# Patient Record
Sex: FEMALE | Race: WHITE | NOT HISPANIC OR LATINO | Employment: FULL TIME | URBAN - METROPOLITAN AREA
[De-identification: names, ages, dates, MRNs, and addresses within clinical notes are randomized per-mention and may not be internally consistent; named-entity substitution may affect disease eponyms.]

---

## 2017-07-12 LAB — MRSA SCREEN BY PCR: NEGATIVE

## 2017-07-13 ENCOUNTER — HISTORICAL (OUTPATIENT)
Dept: ADMINISTRATIVE | Facility: HOSPITAL | Age: 52
End: 2017-07-13

## 2017-07-13 LAB
ABS NEUT (OLG): 5.58 X10(3)/MCL (ref 2.1–9.2)
ALBUMIN SERPL-MCNC: 3.5 GM/DL (ref 3.4–5)
ALBUMIN/GLOB SERPL: 1.2 {RATIO}
ALP SERPL-CCNC: 87 UNIT/L (ref 38–126)
ALT SERPL-CCNC: 26 UNIT/L (ref 12–78)
APTT PPP: 31.4 SECOND(S) (ref 20.6–36)
AST SERPL-CCNC: 18 UNIT/L (ref 15–37)
BASOPHILS # BLD AUTO: 0 X10(3)/MCL (ref 0–0.2)
BASOPHILS NFR BLD AUTO: 0 %
BILIRUB SERPL-MCNC: 0.5 MG/DL (ref 0.2–1)
BILIRUBIN DIRECT+TOT PNL SERPL-MCNC: 0.1 MG/DL (ref 0–0.2)
BILIRUBIN DIRECT+TOT PNL SERPL-MCNC: 0.4 MG/DL (ref 0–0.8)
BUN SERPL-MCNC: 10 MG/DL (ref 7–18)
CALCIUM SERPL-MCNC: 8.6 MG/DL (ref 8.5–10.1)
CHLORIDE SERPL-SCNC: 105 MMOL/L (ref 98–107)
CO2 SERPL-SCNC: 24 MMOL/L (ref 21–32)
CREAT SERPL-MCNC: 0.68 MG/DL (ref 0.55–1.02)
CROSSMATCH INTERPRETATION: NORMAL
EOSINOPHIL # BLD AUTO: 0.1 X10(3)/MCL (ref 0–0.9)
EOSINOPHIL NFR BLD AUTO: 1 %
ERYTHROCYTE [DISTWIDTH] IN BLOOD BY AUTOMATED COUNT: 12.9 % (ref 11.5–17)
GLOBULIN SER-MCNC: 3 GM/DL (ref 2.4–3.5)
GLUCOSE SERPL-MCNC: 92 MG/DL (ref 74–106)
GROUP & RH: NORMAL
HCO3 UR-SCNC: 23.9 MMOL/L (ref 22–26)
HCT VFR BLD AUTO: 35.2 % (ref 37–47)
HGB BLD-MCNC: 12.1 GM/DL (ref 12–16)
INR PPP: 1.14 (ref 0–1.27)
LYMPHOCYTES # BLD AUTO: 1.2 X10(3)/MCL (ref 0.6–4.6)
LYMPHOCYTES NFR BLD AUTO: 15 %
MCH RBC QN AUTO: 31.1 PG (ref 27–31)
MCHC RBC AUTO-ENTMCNC: 34.4 GM/DL (ref 33–36)
MCV RBC AUTO: 90.5 FL (ref 80–94)
MONOCYTES # BLD AUTO: 0.8 X10(3)/MCL (ref 0.1–1.3)
MONOCYTES NFR BLD AUTO: 10 %
NEUTROPHILS # BLD AUTO: 5.58 X10(3)/MCL (ref 2.1–9.2)
NEUTROPHILS NFR BLD AUTO: 72 %
O2 HGB ARTERIAL: 96.1 % (ref 94–97)
PCO2 BLDA: 36 MMHG (ref 35–45)
PH SMN: 7.43 [PH] (ref 7.35–7.45)
PLATELET # BLD AUTO: 245 X10(3)/MCL (ref 130–400)
PMV BLD AUTO: 10.7 FL (ref 9.4–12.4)
PO2 BLDA: 81 MMHG (ref 80–100)
POC ALLENS TEST: ABNORMAL
POC BE: -0.1 (ref -2–2)
POC CAO2: 16.3 ML/DL (ref 15.7–21.6)
POC CO HGB: 2.1 %
POC CO2: 25 MMOL/L (ref 22–27)
POC IONIZED CALCIUM: 1.18 MMOL/L (ref 1.12–1.23)
POC MET HGB: 0.8 % (ref 0.4–1.5)
POC SAMPLESOURCE: ABNORMAL
POC SATURATED O2: 96.2 % (ref 95–98)
POC SITE: ABNORMAL
POC THB: 12 GM/DL (ref 12–16)
POC TREATMENT: ABNORMAL
POTASSIUM BLD-SCNC: 3.8 MMOL/L (ref 3.6–5)
POTASSIUM SERPL-SCNC: 4 MMOL/L (ref 3.5–5.1)
PRODUCT READY: NORMAL
PROT SERPL-MCNC: 6.5 GM/DL (ref 6.4–8.2)
PROTHROMBIN TIME: 14.4 SECOND(S) (ref 12.1–14.2)
RBC # BLD AUTO: 3.89 X10(6)/MCL (ref 4.2–5.4)
SODIUM BLD-SCNC: 136 MMOL/L (ref 137–145)
SODIUM SERPL-SCNC: 138 MMOL/L (ref 136–145)
WBC # SPEC AUTO: 7.7 X10(3)/MCL (ref 4.5–11.5)

## 2017-07-14 LAB
ABS NEUT (OLG): 11.26 X10(3)/MCL (ref 2.1–9.2)
ABS NEUT (OLG): 5.14 X10(3)/MCL (ref 2.1–9.2)
ALBUMIN SERPL-MCNC: 3.3 GM/DL (ref 3.4–5)
ALBUMIN/GLOB SERPL: 1.1 RATIO (ref 1.1–2)
ALP SERPL-CCNC: 80 UNIT/L (ref 38–126)
ALT SERPL-CCNC: 35 UNIT/L (ref 12–78)
APPEARANCE, UA: ABNORMAL
APTT PPP: 28.2 SECOND(S) (ref 20.6–36)
APTT PPP: 32 SECOND(S) (ref 20.6–36)
AST SERPL-CCNC: 28 UNIT/L (ref 15–37)
BACTERIA SPEC CULT: ABNORMAL /HPF
BASOPHILS # BLD AUTO: 0 X10(3)/MCL (ref 0–0.2)
BASOPHILS # BLD AUTO: 0 X10(3)/MCL (ref 0–0.2)
BASOPHILS NFR BLD AUTO: 0 %
BASOPHILS NFR BLD AUTO: 0 %
BILIRUB SERPL-MCNC: 0.5 MG/DL (ref 0.2–1)
BILIRUB UR QL STRIP: NEGATIVE
BILIRUBIN DIRECT+TOT PNL SERPL-MCNC: 0.1 MG/DL (ref 0–0.5)
BILIRUBIN DIRECT+TOT PNL SERPL-MCNC: 0.4 MG/DL (ref 0–0.8)
BUN SERPL-MCNC: 8 MG/DL (ref 7–18)
BUN SERPL-MCNC: 8 MG/DL (ref 7–18)
CALCIUM SERPL-MCNC: 8.5 MG/DL (ref 8.5–10.1)
CALCIUM SERPL-MCNC: 8.7 MG/DL (ref 8.5–10.1)
CHLORIDE SERPL-SCNC: 109 MMOL/L (ref 98–107)
CHLORIDE SERPL-SCNC: 110 MMOL/L (ref 98–107)
CO2 SERPL-SCNC: 21 MMOL/L (ref 21–32)
CO2 SERPL-SCNC: 22 MMOL/L (ref 21–32)
COLOR UR: ABNORMAL
CREAT SERPL-MCNC: 0.65 MG/DL (ref 0.55–1.02)
CREAT SERPL-MCNC: 0.74 MG/DL (ref 0.55–1.02)
EOSINOPHIL # BLD AUTO: 0.1 X10(3)/MCL (ref 0–0.9)
EOSINOPHIL # BLD AUTO: 0.1 X10(3)/MCL (ref 0–0.9)
EOSINOPHIL NFR BLD AUTO: 1 %
EOSINOPHIL NFR BLD AUTO: 2 %
ERYTHROCYTE [DISTWIDTH] IN BLOOD BY AUTOMATED COUNT: 12.8 % (ref 11.5–17)
ERYTHROCYTE [DISTWIDTH] IN BLOOD BY AUTOMATED COUNT: 13.2 % (ref 11.5–17)
GLOBULIN SER-MCNC: 2.9 GM/DL (ref 2.4–3.5)
GLUCOSE (UA): NEGATIVE
GLUCOSE SERPL-MCNC: 109 MG/DL (ref 74–106)
GLUCOSE SERPL-MCNC: 179 MG/DL (ref 74–106)
HCO3 UR-SCNC: 21.4 MMOL/L (ref 22–26)
HCO3 UR-SCNC: 22.7 MMOL/L (ref 22–26)
HCT VFR BLD AUTO: 30.5 % (ref 37–47)
HCT VFR BLD AUTO: 31.6 % (ref 37–47)
HCT VFR BLD AUTO: 34 % (ref 37–47)
HGB BLD-MCNC: 10.3 GM/DL (ref 12–16)
HGB BLD-MCNC: 10.5 GM/DL (ref 12–16)
HGB BLD-MCNC: 11.5 GM/DL (ref 12–16)
HGB UR QL STRIP: NEGATIVE
INR PPP: 1.11 (ref 0–1.27)
INR PPP: 1.39 (ref 0–1.27)
KETONES UR QL STRIP: NEGATIVE
LEUKOCYTE ESTERASE UR QL STRIP: NEGATIVE
LYMPHOCYTES # BLD AUTO: 1.3 X10(3)/MCL (ref 0.6–4.6)
LYMPHOCYTES # BLD AUTO: 1.3 X10(3)/MCL (ref 0.6–4.6)
LYMPHOCYTES NFR BLD AUTO: 10 %
LYMPHOCYTES NFR BLD AUTO: 18 %
MCH RBC QN AUTO: 31.3 PG (ref 27–31)
MCH RBC QN AUTO: 31.4 PG (ref 27–31)
MCHC RBC AUTO-ENTMCNC: 33.2 GM/DL (ref 33–36)
MCHC RBC AUTO-ENTMCNC: 33.8 GM/DL (ref 33–36)
MCV RBC AUTO: 92.6 FL (ref 80–94)
MCV RBC AUTO: 94.6 FL (ref 80–94)
MONOCYTES # BLD AUTO: 0.6 X10(3)/MCL (ref 0.1–1.3)
MONOCYTES # BLD AUTO: 0.6 X10(3)/MCL (ref 0.1–1.3)
MONOCYTES NFR BLD AUTO: 5 %
MONOCYTES NFR BLD AUTO: 8 %
NEUTROPHILS # BLD AUTO: 11.26 X10(3)/MCL (ref 1.4–7.9)
NEUTROPHILS # BLD AUTO: 5.14 X10(3)/MCL (ref 1.4–7.9)
NEUTROPHILS NFR BLD AUTO: 71 %
NEUTROPHILS NFR BLD AUTO: 84 %
NITRITE UR QL STRIP: NEGATIVE
O2 HGB ARTERIAL: 96.3 % (ref 94–97)
PCO2 BLDA: 37 MMHG (ref 35–45)
PCO2 BLDA: 42 MMHG (ref 35–45)
PH SMN: 7.34 [PH] (ref 7.35–7.45)
PH SMN: 7.37 [PH] (ref 7.35–7.45)
PH UR STRIP: 6 [PH] (ref 5–9)
PLATELET # BLD AUTO: 241 X10(3)/MCL (ref 130–400)
PLATELET # BLD AUTO: 283 X10(3)/MCL (ref 130–400)
PMV BLD AUTO: 10 FL (ref 9.4–12.4)
PMV BLD AUTO: 10.2 FL (ref 9.4–12.4)
PO2 BLDA: 103 MMHG (ref 80–100)
PO2 BLDA: 85 MMHG (ref 80–100)
POC ALLENS TEST: ABNORMAL
POC ALLENS TEST: ABNORMAL
POC BE: -3 (ref -2–2)
POC BE: -3.5 (ref -2–2)
POC CAO2: 14.5 ML/DL (ref 15.7–21.6)
POC CO HGB: 2.1 %
POC CO2: 22.5 MMOL/L (ref 22–27)
POC CO2: 24 MMOL/L (ref 22–27)
POC IONIZED CALCIUM: 1.18 MMOL/L (ref 1.12–1.23)
POC IONIZED CALCIUM: 1.28 MMOL/L (ref 1.12–1.23)
POC MET HGB: 0.6 % (ref 0.4–1.5)
POC SAMPLESOURCE: ABNORMAL
POC SAMPLESOURCE: ABNORMAL
POC SATURATED O2: 96.1 % (ref 95–98)
POC SATURATED O2: 98 % (ref 95–98)
POC SITE: ABNORMAL
POC SITE: ABNORMAL
POC THB: 10.6 GM/DL (ref 12–16)
POC TREATMENT: ABNORMAL
POC TREATMENT: ABNORMAL
POTASSIUM BLD-SCNC: 3.4 MMOL/L (ref 3.6–5)
POTASSIUM BLD-SCNC: 4.1 MMOL/L (ref 3.6–5)
POTASSIUM SERPL-SCNC: 3.8 MMOL/L (ref 3.5–5.1)
POTASSIUM SERPL-SCNC: 4 MMOL/L (ref 3.5–5.1)
POTASSIUM SERPL-SCNC: 4.1 MMOL/L (ref 3.5–5.1)
PROT SERPL-MCNC: 6.2 GM/DL (ref 6.4–8.2)
PROT UR QL STRIP: NEGATIVE
PROTHROMBIN TIME: 14.1 SECOND(S) (ref 12.1–14.2)
PROTHROMBIN TIME: 16.8 SECOND(S) (ref 12.1–14.2)
RBC # BLD AUTO: 3.34 X10(6)/MCL (ref 4.2–5.4)
RBC # BLD AUTO: 3.67 X10(6)/MCL (ref 4.2–5.4)
RBC #/AREA URNS HPF: ABNORMAL /HPF
SETTING 1 ABG: ABNORMAL
SETTING 1 ABG: ABNORMAL
SETTING 2 ABG: ABNORMAL
SETTING 2 ABG: ABNORMAL
SETTING 3 ABG: ABNORMAL
SETTING 3 ABG: ABNORMAL
SETTING 4 ABG: ABNORMAL
SETTING 4 ABG: ABNORMAL
SODIUM BLD-SCNC: 137 MMOL/L (ref 137–145)
SODIUM BLD-SCNC: 137 MMOL/L (ref 137–145)
SODIUM SERPL-SCNC: 144 MMOL/L (ref 136–145)
SODIUM SERPL-SCNC: 145 MMOL/L (ref 136–145)
SP GR UR STRIP: 1.02 (ref 1–1.03)
SQUAMOUS EPITHELIAL, UA: 7 /HPF (ref 0–4)
UROBILINOGEN UR STRIP-ACNC: 1
WBC # SPEC AUTO: 13.4 X10(3)/MCL (ref 4.5–11.5)
WBC # SPEC AUTO: 7.2 X10(3)/MCL (ref 4.5–11.5)
WBC #/AREA URNS HPF: ABNORMAL /HPF

## 2017-07-15 LAB
ABS NEUT (OLG): 9.77 X10(3)/MCL (ref 2.1–9.2)
BASOPHILS # BLD AUTO: 0 X10(3)/MCL (ref 0–0.2)
BASOPHILS NFR BLD AUTO: 0 %
BUN SERPL-MCNC: 6 MG/DL (ref 7–18)
CALCIUM SERPL-MCNC: 7.2 MG/DL (ref 8.5–10.1)
CHLORIDE SERPL-SCNC: 109 MMOL/L (ref 98–107)
CO2 SERPL-SCNC: 23 MMOL/L (ref 21–32)
CREAT SERPL-MCNC: 0.47 MG/DL (ref 0.55–1.02)
ERYTHROCYTE [DISTWIDTH] IN BLOOD BY AUTOMATED COUNT: 13.3 % (ref 11.5–17)
GLUCOSE SERPL-MCNC: 104 MG/DL (ref 74–106)
HCT VFR BLD AUTO: 28.9 % (ref 37–47)
HGB BLD-MCNC: 9.8 GM/DL (ref 12–16)
LYMPHOCYTES # BLD AUTO: 1 X10(3)/MCL (ref 0.6–4.6)
LYMPHOCYTES NFR BLD AUTO: 9 %
MCH RBC QN AUTO: 31.8 PG (ref 27–31)
MCHC RBC AUTO-ENTMCNC: 33.9 GM/DL (ref 33–36)
MCV RBC AUTO: 93.8 FL (ref 80–94)
MONOCYTES # BLD AUTO: 0.9 X10(3)/MCL (ref 0.1–1.3)
MONOCYTES NFR BLD AUTO: 8 %
NEUTROPHILS # BLD AUTO: 9.77 X10(3)/MCL (ref 1.4–7.9)
NEUTROPHILS NFR BLD AUTO: 83 %
PLATELET # BLD AUTO: 214 X10(3)/MCL (ref 130–400)
PMV BLD AUTO: 10.7 FL (ref 9.4–12.4)
POTASSIUM SERPL-SCNC: 3.7 MMOL/L (ref 3.5–5.1)
RBC # BLD AUTO: 3.08 X10(6)/MCL (ref 4.2–5.4)
SODIUM SERPL-SCNC: 143 MMOL/L (ref 136–145)
WBC # SPEC AUTO: 11.8 X10(3)/MCL (ref 4.5–11.5)

## 2017-07-16 LAB
ABS NEUT (OLG): 10.7 X10(3)/MCL (ref 2.1–9.2)
BASOPHILS # BLD AUTO: 0 X10(3)/MCL (ref 0–0.2)
BASOPHILS NFR BLD AUTO: 0 %
EOSINOPHIL # BLD AUTO: 0 X10(3)/MCL (ref 0–0.9)
EOSINOPHIL NFR BLD AUTO: 0 %
ERYTHROCYTE [DISTWIDTH] IN BLOOD BY AUTOMATED COUNT: 13.9 % (ref 11.5–17)
HCT VFR BLD AUTO: 32.6 % (ref 37–47)
HGB BLD-MCNC: 10.7 GM/DL (ref 12–16)
LYMPHOCYTES # BLD AUTO: 2.2 X10(3)/MCL (ref 0.6–4.6)
LYMPHOCYTES NFR BLD AUTO: 15 %
MCH RBC QN AUTO: 31.2 PG (ref 27–31)
MCHC RBC AUTO-ENTMCNC: 32.8 GM/DL (ref 33–36)
MCV RBC AUTO: 95 FL (ref 80–94)
MONOCYTES # BLD AUTO: 1.4 X10(3)/MCL (ref 0.1–1.3)
MONOCYTES NFR BLD AUTO: 10 %
NEUTROPHILS # BLD AUTO: 10.7 X10(3)/MCL (ref 2.1–9.2)
NEUTROPHILS NFR BLD AUTO: 74 %
PLATELET # BLD AUTO: 292 X10(3)/MCL (ref 130–400)
PMV BLD AUTO: 10.8 FL (ref 9.4–12.4)
RBC # BLD AUTO: 3.43 X10(6)/MCL (ref 4.2–5.4)
WBC # SPEC AUTO: 14.5 X10(3)/MCL (ref 4.5–11.5)

## 2017-07-17 LAB
ABS NEUT (OLG): 6.66 X10(3)/MCL (ref 2.1–9.2)
BASOPHILS # BLD AUTO: 0 X10(3)/MCL (ref 0–0.2)
BASOPHILS NFR BLD AUTO: 0 %
EOSINOPHIL # BLD AUTO: 0.1 X10(3)/MCL (ref 0–0.9)
EOSINOPHIL NFR BLD AUTO: 1 %
ERYTHROCYTE [DISTWIDTH] IN BLOOD BY AUTOMATED COUNT: 13.6 % (ref 11.5–17)
HCT VFR BLD AUTO: 29.2 % (ref 37–47)
HGB BLD-MCNC: 9.8 GM/DL (ref 12–16)
LYMPHOCYTES # BLD AUTO: 1.8 X10(3)/MCL (ref 0.6–4.6)
LYMPHOCYTES NFR BLD AUTO: 19 %
MCH RBC QN AUTO: 31.7 PG (ref 27–31)
MCHC RBC AUTO-ENTMCNC: 33.6 GM/DL (ref 33–36)
MCV RBC AUTO: 94.5 FL (ref 80–94)
MONOCYTES # BLD AUTO: 0.9 X10(3)/MCL (ref 0.1–1.3)
MONOCYTES NFR BLD AUTO: 9 %
NEUTROPHILS # BLD AUTO: 6.66 X10(3)/MCL (ref 1.4–7.9)
NEUTROPHILS NFR BLD AUTO: 70 %
PLATELET # BLD AUTO: 223 X10(3)/MCL (ref 130–400)
PMV BLD AUTO: 10.5 FL (ref 9.4–12.4)
RBC # BLD AUTO: 3.09 X10(6)/MCL (ref 4.2–5.4)
WBC # SPEC AUTO: 9.5 X10(3)/MCL (ref 4.5–11.5)

## 2017-07-18 LAB
ABS NEUT (OLG): 6 X10(3)/MCL (ref 2.1–9.2)
BASOPHILS # BLD AUTO: 0 X10(3)/MCL (ref 0–0.2)
BASOPHILS NFR BLD AUTO: 0 %
EOSINOPHIL # BLD AUTO: 0.1 X10(3)/MCL (ref 0–0.9)
EOSINOPHIL NFR BLD AUTO: 1 %
ERYTHROCYTE [DISTWIDTH] IN BLOOD BY AUTOMATED COUNT: 13.7 % (ref 11.5–17)
HCT VFR BLD AUTO: 28.9 % (ref 37–47)
HGB BLD-MCNC: 9.7 GM/DL (ref 12–16)
LYMPHOCYTES # BLD AUTO: 1.3 X10(3)/MCL (ref 0.6–4.6)
LYMPHOCYTES NFR BLD AUTO: 16 %
MCH RBC QN AUTO: 31.5 PG (ref 27–31)
MCHC RBC AUTO-ENTMCNC: 33.6 GM/DL (ref 33–36)
MCV RBC AUTO: 93.8 FL (ref 80–94)
MONOCYTES # BLD AUTO: 0.7 X10(3)/MCL (ref 0.1–1.3)
MONOCYTES NFR BLD AUTO: 8 %
NEUTROPHILS # BLD AUTO: 6 X10(3)/MCL (ref 2.1–9.2)
NEUTROPHILS NFR BLD AUTO: 73 %
PLATELET # BLD AUTO: 259 X10(3)/MCL (ref 130–400)
PMV BLD AUTO: 10.8 FL (ref 9.4–12.4)
RBC # BLD AUTO: 3.08 X10(6)/MCL (ref 4.2–5.4)
WBC # SPEC AUTO: 8.2 X10(3)/MCL (ref 4.5–11.5)

## 2017-07-19 LAB
ABS NEUT (OLG): 4.28 X10(3)/MCL (ref 2.1–9.2)
BASOPHILS # BLD AUTO: 0 X10(3)/MCL (ref 0–0.2)
BASOPHILS NFR BLD AUTO: 0 %
EOSINOPHIL # BLD AUTO: 0.2 X10(3)/MCL (ref 0–0.9)
EOSINOPHIL NFR BLD AUTO: 2 %
ERYTHROCYTE [DISTWIDTH] IN BLOOD BY AUTOMATED COUNT: 13.8 % (ref 11.5–17)
HCT VFR BLD AUTO: 28.2 % (ref 37–47)
HGB BLD-MCNC: 9.5 GM/DL (ref 12–16)
LYMPHOCYTES # BLD AUTO: 1.4 X10(3)/MCL (ref 0.6–4.6)
LYMPHOCYTES NFR BLD AUTO: 20 %
MCH RBC QN AUTO: 31.3 PG (ref 27–31)
MCHC RBC AUTO-ENTMCNC: 33.7 GM/DL (ref 33–36)
MCV RBC AUTO: 92.8 FL (ref 80–94)
MONOCYTES # BLD AUTO: 0.8 X10(3)/MCL (ref 0.1–1.3)
MONOCYTES NFR BLD AUTO: 12 %
NEUTROPHILS # BLD AUTO: 4.28 X10(3)/MCL (ref 2.1–9.2)
NEUTROPHILS NFR BLD AUTO: 64 %
PLATELET # BLD AUTO: 257 X10(3)/MCL (ref 130–400)
PMV BLD AUTO: 10.5 FL (ref 9.4–12.4)
RBC # BLD AUTO: 3.04 X10(6)/MCL (ref 4.2–5.4)
WBC # SPEC AUTO: 6.7 X10(3)/MCL (ref 4.5–11.5)

## 2017-07-20 LAB
ABS NEUT (OLG): 5.19 X10(3)/MCL (ref 2.1–9.2)
BASOPHILS # BLD AUTO: 0 X10(3)/MCL (ref 0–0.2)
BASOPHILS NFR BLD AUTO: 0 %
EOSINOPHIL # BLD AUTO: 0.2 X10(3)/MCL (ref 0–0.9)
EOSINOPHIL NFR BLD AUTO: 2 %
ERYTHROCYTE [DISTWIDTH] IN BLOOD BY AUTOMATED COUNT: 14.3 % (ref 11.5–17)
FINAL CULTURE: NORMAL
HCT VFR BLD AUTO: 30.8 % (ref 37–47)
HGB BLD-MCNC: 10.2 GM/DL (ref 12–16)
LYMPHOCYTES # BLD AUTO: 1.1 X10(3)/MCL (ref 0.6–4.6)
LYMPHOCYTES NFR BLD AUTO: 16 %
MCH RBC QN AUTO: 31.5 PG (ref 27–31)
MCHC RBC AUTO-ENTMCNC: 33.1 GM/DL (ref 33–36)
MCV RBC AUTO: 95.1 FL (ref 80–94)
MONOCYTES # BLD AUTO: 0.4 X10(3)/MCL (ref 0.1–1.3)
MONOCYTES NFR BLD AUTO: 6 %
NEUTROPHILS # BLD AUTO: 5.19 X10(3)/MCL (ref 2.1–9.2)
NEUTROPHILS NFR BLD AUTO: 74 %
PLATELET # BLD AUTO: 284 X10(3)/MCL (ref 130–400)
PMV BLD AUTO: 10 FL (ref 9.4–12.4)
RBC # BLD AUTO: 3.24 X10(6)/MCL (ref 4.2–5.4)
WBC # SPEC AUTO: 7 X10(3)/MCL (ref 4.5–11.5)

## 2018-04-09 ENCOUNTER — HISTORICAL (OUTPATIENT)
Dept: ADMINISTRATIVE | Facility: HOSPITAL | Age: 53
End: 2018-04-09

## 2021-11-14 ENCOUNTER — HISTORICAL (OUTPATIENT)
Dept: ADMINISTRATIVE | Facility: HOSPITAL | Age: 56
End: 2021-11-14

## 2021-12-02 ENCOUNTER — HISTORICAL (OUTPATIENT)
Dept: ADMINISTRATIVE | Facility: HOSPITAL | Age: 56
End: 2021-12-02

## 2021-12-30 ENCOUNTER — HISTORICAL (OUTPATIENT)
Dept: ADMINISTRATIVE | Facility: HOSPITAL | Age: 56
End: 2021-12-30

## 2022-04-07 ENCOUNTER — HISTORICAL (OUTPATIENT)
Dept: ADMINISTRATIVE | Facility: HOSPITAL | Age: 57
End: 2022-04-07
Payer: MEDICAID

## 2022-04-23 VITALS
DIASTOLIC BLOOD PRESSURE: 58 MMHG | WEIGHT: 148.81 LBS | HEIGHT: 62 IN | BODY MASS INDEX: 27.38 KG/M2 | SYSTOLIC BLOOD PRESSURE: 109 MMHG

## 2022-04-30 NOTE — H&P
Patient:   Deja Weir             MRN: [id number]            FIN: [id number]               Age:   51 years     Sex:  Female     :  1965   Associated Diagnoses:   None   Author:   Windy Horvath DO      Basic Information   Present at bedside:  Medical personnel.    Source of history:  Self, Medical record.    Referral source:  [facility] transfer.    History limitation:  None.    Advance directive:  Full code.       History of Present Illness   Patient is a 51-year-old  female who presented to [facility] on 17 with chest pain unrelieved by nitro. Patient has an extensive history of multiple angiograms since 2016 with a total of 5 stents over this time. She recently underwent angiogram with PTCA and restenting of LCX 95%to 0%, PTCA ostial LAD on 17 and was discharged home on 17.  She underwent routine CT angiogram on 17 was 73% stenosis ostial LAD, 60% stenosis proximal LAD, mid RCA 70-80% stenosis.  Recommended CABG.  Patient was transferred to [facility] ICU for close monitoring.  Patient received Plavix loading dose 17 around 12 AM that morning. Patient on Integrilin and tridil drip. Plan for CABG by Dr. Higginbotham on Friday, 17.    Past medical history: CAD S/P multiple angiograms, most recent on 17 and most recent PCI 17, HTN, HLD, Tobacco use-quit 2016,  Surgical history: Multiple coronary angiograms with stenting ×5, carotid angiogram with stenting, tonsillectomy.  Social history: History of tobacco use, quit .  Denies alcohol and illicit drug use.  Family history: Father  at age 55 of acute MI.  Brother has heart disease.   Allergies: NKDA  Medications: Aspirin 325 mg, buspirone 15 mg, Coreg 12.5 mg bid, Plavix 75 mg, diltiazem 120 mg, HCTZ-lisinopril 25 mg-20 mg, isosorbide mononitrate 120 mg, nitro, oxybutynin 15 mg, potassium chloride 20 mEq, Ranexa 1000 mg bid, simvastatin 40 mg, trazodone 100 mg      Review of Systems   Constitutional:  No  fever, No chills, No sweats, No weakness, No fatigue, No loss of appetite, No weight gain, No weight loss.    Eye:  No visual disturbances.    Ear/Nose/Mouth/Throat:  No decreased hearing, No nasal congestion, No sore throat.    Respiratory:  No shortness of breath, No cough, No hemoptysis, No wheezing.    Cardiovascular:  No chest pain, No palpitations, No peripheral edema.    Gastrointestinal:  No nausea, No vomiting, No diarrhea, No constipation, No abdominal pain.    Integumentary:  No rash, No skin lesion.    Neurologic:  Alert and oriented X4, No confusion, No numbness, No tingling, No headache.    Psychiatric:  No anxiety, No depression.       Physical Examination      Vital Signs (last 24 hrs)_____  Last Charted___________  Temp Oral     36.7 DegC  (JUL 12 11:00)  Heart Rate Peripheral   69 bpm  (JUL 12 11:00)  SBP      H 155mmHg  (JUL 12 11:00)  DBP      79 mmHg  (JUL 12 11:00)     General:  Alert and oriented, No acute distress.    Eye:  Pupils are equal, round and reactive to light, Extraocular movements are intact, Normal conjunctiva.    HENT:  Normocephalic, Normal hearing.    Neck:  Supple, Non-tender, No carotid bruit, No jugular venous distention, No lymphadenopathy, No thyromegaly.    Respiratory:  Lungs are clear to auscultation, Respirations are non-labored, Breath sounds are equal, Symmetrical chest wall expansion, No chest wall tenderness.    Cardiovascular:  Normal rate, Regular rhythm, No murmur, No gallop, Good pulses equal in all extremities, Normal peripheral perfusion, No edema.    Gastrointestinal:  Soft, Non-tender, Non-distended, Normal bowel sounds, No organomegaly.    Musculoskeletal:  Normal range of motion, Normal strength, No swelling, No deformity, Bruising bilateral groins status post angiograms..    Integumentary:  Warm, Intact.    Neurologic:  Alert, Oriented, Normal sensory, Normal motor function, No focal deficits, Cranial Nerves II-XII are grossly intact.    Cognition and  Speech:  Oriented, Speech clear and coherent.    Psychiatric:  Cooperative, Appropriate mood & affect.    Genitourinary:  No costovertebral angle tenderness.       Review / Management   Labs: CMP: Sodium 136, potassium 3.7, chloride 103, bicarb 26, BUN 9, creatinine 0.74, glucose 135, GFR greater than 60.  Troponins trended to 0.28, 0.3-8, 0.264.  CBC: WBC 7.9, hemoglobin 11.7, hematocrit 33.6, platelets 220.    Chest x-ray  IMPRESSION:   Mild bilateral infiltrates, possibly edema or infection.      Impression and Plan   1.ACS, CAGB planned for 7/14/17  2.CAD S/P most recent PCI 7/7/17, most recent angiogram 7/11/17  3.H/o HTN  4.HLD  5. H/o Tobacco use    Plan: Patient will be monitored in the ICU secondary to recurrent NSTEMI/unstable angina. Patient on integrillin and tridil. Holding Aspirin and plavix, with plan for CABG on Friday, 7/14/17, by Dr. Higginbotham. Patient is currently CP free and resting comfortably.

## 2022-04-30 NOTE — DISCHARGE SUMMARY
DISCHARGE DATE:  07/20/2017    FINAL DIAGNOSES:    1. Coronary artery disease.  2. Hypertension.  3. Hyperlipidemia.  4. Tobacco abuse.    OPERATIVE PROCEDURE:    1. Coronary artery bypass grafting x three.  2. Endoscopic vein harvesting.    SUMMARY:  The patient is a 51-year-old female who was transferred from an outlying facility with chest pains.  She had a left heart catheterization, which revealed multivessel coronary artery disease.  She was transferred to [facility], kept on Integrilin and ______ drip for a couple of days, then underwent successful coronary bypass grafting x three with endoscopic vein harvesting.  Postoperatively, she was doing very well.  She was extubated without complications.  She is hemodynamically stable and was transferred out to the floor.  Her mediastinal left-sided chest tube was removed with minimal drainage, her wound incision is clear and she is ambulating in the halls.  She is now being discharged home in good condition.    DISCHARGE INSTRUCTIONS:  She has been instructed on diet, wound care and activity.  Medications she was taking prior to the procedure she may continue on.  Follow up with Dr. Angelique Higginbotham in three weeks.    DICTATED BY BHARAT CHACON:          ______________________________  MD KAYDEN Murdock/LELE  DD:  08/17/2017  Time:  01:51PM  DT:  08/17/2017  Time:  03:39PM  Job #:  [id number]

## 2022-04-30 NOTE — H&P
ADDENDUM TO H & P     I saw Ms. Weir today in the ICU with the resident.  Patient is post CAB.  Hemodynamics are stable and post CAB protocol underway.  Agree with the assessment and plan as per the resident and will continue post CAB and ICU monitoring.        ______________________________  W. MD JAKOB Ko/LELE  DD:  07/14/2017  Time:  01:25PM  DT:  07/14/2017  Time:  02:15PM  Job #:  [id number]    The H&P was reviewed, the patient was examined, and the following changes to the patients condition are noted:  ______________________________________________________________________________  ______________________________________________________________________________  ______________________________________________________________________________  [  ] No changes to the patient's condition:      ______________________________                                             ___________________  PHYSICIAN SIGNATURE                                                             DATE/TIME

## 2022-04-30 NOTE — OP NOTE
DATE OF SURGERY:    07/14/2017    SURGEON:  Dante Higginbotham MD    PREOPERATIVE DIAGNOSIS:  Severe coronary artery disease.    POSTOPERATIVE DIAGNOSIS:  Severe coronary artery disease.    PROCEDURE:  Coronary artery bypass grafting times three with left internal mammary artery to left anterior descending, saphenous vein graft to obtuse marginal, saphenous venous graft to distal right coronary artery, endoscopic venous harvesting bilateral lower extremities saphenous vein.    ASSISTANT:  Carline De La Paz    PROCEDURE IN DETAIL:  Under informed consent the patient was taken to the OR and placed in a supine position.  General anesthesia was induced and therefore maintained for the remainder of the procedure.  All the pressure points were padded out equally.  The skin over the chest, abdomen, and legs was prepped and draped in the usual sterile fashion.  IV antibiotics were administered.  Anesthesia placed the appropriate lines.  Endoscopic venous harvesting was performed in the bilateral lower extremities as the vein on the left side had partial sclerosis.  I had two good segments for bypass.  Midline incision was made and taken down through the subcutaneous tissue and fascia exposing the sternum that was penetrated in the midline.  Pericardium was opened.  There was very good contractility with a normal aorta.  Mammary was harvested on a pedicle.  Midline retractor was placed again.  Pericardial stay sutures placed.  Ascending aortic cannulation was performed.  Mammary was cut, the distal pedicle ligated and clipped, proximal pedicle was controlled with a bulldog and shaved for later anastomosis.   The mammary had good flow in it.  The dual staged venous cannula was placed in the right atrium and when the ACT was therapeutic the patient went on full cardiopulmonary bypass with good emptying.  Cross clamp was placed followed by antegrade dose of cardioplegia and one liter of cold blood and repeated after 20  minutes with another dose with good isoelectricity throughout the whole case.  Patient's temperature was allowed to drift to 34 degrees Celsius.  Examination of the lateral aspect of the heart revealed the obtuse marginal vessel.  It was opened and anastomosed with reverse segment of saphenous vein with good flow down the graft.  The vein was cut to the appropriate length.  The right coronary artery was opened distal to the last stent and was anastomosed with reverse segment of saphenous vein with good flow down the graft.  A slit was made in the pericardium.  The left anterior descending was opened proximal.  Epicardium was anastomosed to the mammary in a running Prolene fashion.  Mammary pedicle was tacked to the epicardium, with brisk blood flow down the left anterior descending when the mammary bulldog was released.  Patient was being rewarmed.  Cross clamp was removed, partial occlusion clamp was placed.  Two arteriotomies were performed.  A 4 mm punch was used.  Proximal anastomosis was made and marked.  The partial occlusion clamp was released.  Proximal and distal anastomoses checked for hemostasis.  When the patient was warm she came off the pump with no problems.  Heparin was reversed with protamine.  Mediastinum and left chest were drained.  The sternum was wired and the wounds were closed in layers in absorbable sutures.  The patient tolerated the procedure well.  She was transferred to the Intensive Care Unit in a stable condition.        ______________________________  MD KAYDEN Murdock/WENDY  DD:  07/14/2017  Time:  10:01AM  DT:  07/14/2017  Time:  03:12PM  Job #:  [id number]

## 2022-06-14 ENCOUNTER — HOSPITAL ENCOUNTER (OUTPATIENT)
Dept: RADIOLOGY | Facility: CLINIC | Age: 57
Discharge: HOME | End: 2022-06-14
Attending: ORTHOPAEDIC SURGERY
Payer: MEDICAID

## 2022-06-14 ENCOUNTER — OFFICE VISIT (OUTPATIENT)
Dept: ORTHOPEDICS | Facility: CLINIC | Age: 57
End: 2022-06-14
Payer: MEDICAID

## 2022-06-14 VITALS
HEART RATE: 80 BPM | DIASTOLIC BLOOD PRESSURE: 58 MMHG | WEIGHT: 148.81 LBS | BODY MASS INDEX: 27.38 KG/M2 | SYSTOLIC BLOOD PRESSURE: 109 MMHG | RESPIRATION RATE: 18 BRPM | HEIGHT: 62 IN

## 2022-06-14 DIAGNOSIS — S52.572D OTHER CLOSED INTRA-ARTICULAR FRACTURE OF DISTAL END OF LEFT RADIUS WITH ROUTINE HEALING, SUBSEQUENT ENCOUNTER: Primary | ICD-10-CM

## 2022-06-14 DIAGNOSIS — S52.572D OTHER CLOSED INTRA-ARTICULAR FRACTURE OF DISTAL END OF LEFT RADIUS WITH ROUTINE HEALING, SUBSEQUENT ENCOUNTER: ICD-10-CM

## 2022-06-14 PROCEDURE — 4010F ACE/ARB THERAPY RXD/TAKEN: CPT | Mod: CPTII,,, | Performed by: PHYSICIAN ASSISTANT

## 2022-06-14 PROCEDURE — 3078F DIAST BP <80 MM HG: CPT | Mod: CPTII,,, | Performed by: PHYSICIAN ASSISTANT

## 2022-06-14 PROCEDURE — 1159F PR MEDICATION LIST DOCUMENTED IN MEDICAL RECORD: ICD-10-PCS | Mod: CPTII,,, | Performed by: PHYSICIAN ASSISTANT

## 2022-06-14 PROCEDURE — 3008F BODY MASS INDEX DOCD: CPT | Mod: CPTII,,, | Performed by: PHYSICIAN ASSISTANT

## 2022-06-14 PROCEDURE — 3074F PR MOST RECENT SYSTOLIC BLOOD PRESSURE < 130 MM HG: ICD-10-PCS | Mod: CPTII,,, | Performed by: PHYSICIAN ASSISTANT

## 2022-06-14 PROCEDURE — 3008F PR BODY MASS INDEX (BMI) DOCUMENTED: ICD-10-PCS | Mod: CPTII,,, | Performed by: PHYSICIAN ASSISTANT

## 2022-06-14 PROCEDURE — 99024 POSTOP FOLLOW-UP VISIT: CPT | Mod: ,,, | Performed by: PHYSICIAN ASSISTANT

## 2022-06-14 PROCEDURE — 99024 PR POST-OP FOLLOW-UP VISIT: ICD-10-PCS | Mod: ,,, | Performed by: PHYSICIAN ASSISTANT

## 2022-06-14 PROCEDURE — 73110 XR WRIST COMPLETE 3 VIEWS LEFT: ICD-10-PCS | Mod: LT,,, | Performed by: ORTHOPAEDIC SURGERY

## 2022-06-14 PROCEDURE — 4010F PR ACE/ARB THEARPY RXD/TAKEN: ICD-10-PCS | Mod: CPTII,,, | Performed by: PHYSICIAN ASSISTANT

## 2022-06-14 PROCEDURE — 3074F SYST BP LT 130 MM HG: CPT | Mod: CPTII,,, | Performed by: PHYSICIAN ASSISTANT

## 2022-06-14 PROCEDURE — 1159F MED LIST DOCD IN RCRD: CPT | Mod: CPTII,,, | Performed by: PHYSICIAN ASSISTANT

## 2022-06-14 PROCEDURE — 3078F PR MOST RECENT DIASTOLIC BLOOD PRESSURE < 80 MM HG: ICD-10-PCS | Mod: CPTII,,, | Performed by: PHYSICIAN ASSISTANT

## 2022-06-14 PROCEDURE — 73110 X-RAY EXAM OF WRIST: CPT | Mod: LT,,, | Performed by: ORTHOPAEDIC SURGERY

## 2022-06-14 RX ORDER — ACETAMINOPHEN 500 MG
TABLET ORAL
COMMUNITY

## 2022-06-14 RX ORDER — ISOSORBIDE MONONITRATE 60 MG/1
60 TABLET, EXTENDED RELEASE ORAL DAILY
COMMUNITY
Start: 2022-05-17

## 2022-06-14 RX ORDER — RIVAROXABAN 20 MG/1
20 TABLET, FILM COATED ORAL DAILY
COMMUNITY
Start: 2022-05-24 | End: 2023-12-04

## 2022-06-14 RX ORDER — FLUOXETINE HYDROCHLORIDE 20 MG/1
20 CAPSULE ORAL DAILY
COMMUNITY
Start: 2022-05-31

## 2022-06-14 RX ORDER — LISINOPRIL 20 MG/1
20 TABLET ORAL DAILY
COMMUNITY
Start: 2022-05-17

## 2022-06-14 RX ORDER — CARVEDILOL 12.5 MG/1
12.5 TABLET ORAL 2 TIMES DAILY
COMMUNITY
Start: 2022-05-17

## 2022-06-14 RX ORDER — OXYBUTYNIN CHLORIDE 15 MG/1
15 TABLET, EXTENDED RELEASE ORAL DAILY
COMMUNITY
Start: 2022-05-31

## 2022-06-14 RX ORDER — TRAZODONE HYDROCHLORIDE 100 MG/1
100 TABLET ORAL NIGHTLY
COMMUNITY
Start: 2022-03-09

## 2022-06-14 NOTE — PROGRESS NOTES
"Subjective:       Patient ID: Deja Weir is a 56 y.o. female.  Chief Complaint   Patient presents with    Left Wrist - Injury     7 month f/u orif left distal radius fx, using bone stim.      HPI   Patient presents for 7 month follow up ORIF left distal radius fracture.States she has been using a bone stimulator due to her non-union. States she does smoke currently but has cut back significantly. Overall she is doing very well. She does have a loss of range of motion at her MTP joint of her 4th digit where a large laceration was present, otherwise she has no acute complaints.     ROS:  Constitutional: Denies fever chills  Eyes: No change in vision  ENT: No ringing or current infections  CV: No chest pain  Resp: No labored breathing  MSK: Pain evident at site of injury located in HPI,   Integ: No signs of abrasions or lacerations  Neuro: No numbness or tingling  Lymphatic: No swelling outside the area of injury     Current Outpatient Medications on File Prior to Visit   Medication Sig Dispense Refill    carvediloL (COREG) 12.5 MG tablet Take 12.5 mg by mouth 2 (two) times daily.      cholecalciferol, vitamin D3, (VITAMIN D3) 50 mcg (2,000 unit) Cap capsule 1 capsule      FLUoxetine 20 MG capsule Take 20 mg by mouth once daily.      isosorbide mononitrate (IMDUR) 60 MG 24 hr tablet Take 60 mg by mouth once daily.      lisinopriL (PRINIVIL,ZESTRIL) 20 MG tablet Take 20 mg by mouth once daily.      oxybutynin (DITROPAN XL) 15 MG TR24 Take 15 mg by mouth once daily.      traZODone (DESYREL) 100 MG tablet Take 100 mg by mouth nightly.      XARELTO 20 mg Tab Take 20 mg by mouth once daily.       No current facility-administered medications on file prior to visit.          Objective:      BP (!) 109/58   Pulse 80   Resp 18   Ht 5' 2" (1.575 m)   Wt 67.5 kg (148 lb 13 oz)   BMI 27.22 kg/m²   Physical Exam  General the patient is alert and oriented x3 no acute distress nontoxic-appearing appropriate " affect.    Constitutional: Vital signs are examined and stable.  Resp: No signs of labored breathing    Musculoskeletal:     Left upper extremity: no swelling; no deformity; no open wounds; compartments are soft and compressible; no pain with ROM at the shoulder, elbow, wrist, or digits, no tenderness to palpation; AIN/PIN/Ulnar motor intact; SILT distally;BCR distally; Radial pulse 2+        Body mass index is 27.22 kg/m².  Ideal body weight: 50.1 kg (110 lb 7.2 oz)  Adjusted ideal body weight: 57.1 kg (125 lb 12.7 oz)  No results found for: HGBA1C  Hgb   Date Value Ref Range Status   07/20/2017 10.2 (L) 12.0 - 16.0 gm/dL Final   07/19/2017 9.5 (L) 12.0 - 16.0 gm/dL Final     Hct   Date Value Ref Range Status   07/20/2017 30.8 (L) 37.0 - 47.0 % Final   07/19/2017 28.2 (L) 37.0 - 47.0 % Final     No results found for: IRON  No components found for: FROLATE  No results found for: [id number]  WBC   Date Value Ref Range Status   07/20/2017 7.0 4.5 - 11.5 x10(3)/mcL Final   07/19/2017 6.7 4.5 - 11.5 x10(3)/mcL Final       Radiology: complete x-rays of the left wrist reveal hardware intact without signs of loosening or failure. Continued bony consolidation noted as compared to previous images.        Assessment:         1. Other closed intra-articular fracture of distal end of left radius with routine healing, subsequent encounter  X-Ray Wrist Complete Left           Plan:         Follow up if symptoms worsen or fail to improve.    Deja was seen today for injury.    Diagnoses and all orders for this visit:    Other closed intra-articular fracture of distal end of left radius with routine healing, subsequent encounter  -     X-Ray Wrist Complete Left; Future      - Patient continues to heal following surgery. She appears to have benefited greatly from use of her bone stimulator with increased union of her fracture. She has very little pain at the fracture site which increases her likelihood of union.   - She will call  if she would like to begin therapy. Otherwise no weight bearing or range of motion restrictions  -Follow up as needed.   -ED precautions given    The above findings, diagnostics, and treatment plan were discussed with Dr. Wang who is in agreement with the plan of care except as stated in additional documentation.       Xiomara Brice PA-C  [facility]   Orthopedic Trauma        No future appointments.

## 2023-10-10 DIAGNOSIS — M79.643 PAIN OF HAND, UNSPECIFIED LATERALITY: Primary | ICD-10-CM

## 2023-12-04 ENCOUNTER — OFFICE VISIT (OUTPATIENT)
Dept: ORTHOPEDICS | Facility: CLINIC | Age: 58
End: 2023-12-04
Payer: MEDICAID

## 2023-12-04 ENCOUNTER — HOSPITAL ENCOUNTER (OUTPATIENT)
Dept: RADIOLOGY | Facility: HOSPITAL | Age: 58
Discharge: HOME | End: 2023-12-04
Attending: STUDENT IN AN ORGANIZED HEALTH CARE EDUCATION/TRAINING PROGRAM
Payer: MEDICAID

## 2023-12-04 VITALS
BODY MASS INDEX: 27.79 KG/M2 | WEIGHT: 151 LBS | HEART RATE: 60 BPM | HEIGHT: 62 IN | SYSTOLIC BLOOD PRESSURE: 125 MMHG | DIASTOLIC BLOOD PRESSURE: 77 MMHG

## 2023-12-04 DIAGNOSIS — M79.643 PAIN OF HAND, UNSPECIFIED LATERALITY: ICD-10-CM

## 2023-12-04 DIAGNOSIS — G56.02 CARPAL TUNNEL SYNDROME OF LEFT WRIST: ICD-10-CM

## 2023-12-04 DIAGNOSIS — G56.01 CARPAL TUNNEL SYNDROME OF RIGHT WRIST: Primary | ICD-10-CM

## 2023-12-04 PROCEDURE — 99204 PR OFFICE/OUTPT VISIT, NEW, LEVL IV, 45-59 MIN: ICD-10-PCS | Mod: 25,S$PBB,, | Performed by: STUDENT IN AN ORGANIZED HEALTH CARE EDUCATION/TRAINING PROGRAM

## 2023-12-04 PROCEDURE — 3078F DIAST BP <80 MM HG: CPT | Mod: CPTII,,, | Performed by: STUDENT IN AN ORGANIZED HEALTH CARE EDUCATION/TRAINING PROGRAM

## 2023-12-04 PROCEDURE — 96372 THER/PROPH/DIAG INJ SC/IM: CPT | Mod: PBBFAC

## 2023-12-04 PROCEDURE — 1159F MED LIST DOCD IN RCRD: CPT | Mod: CPTII,,, | Performed by: STUDENT IN AN ORGANIZED HEALTH CARE EDUCATION/TRAINING PROGRAM

## 2023-12-04 PROCEDURE — 99214 OFFICE O/P EST MOD 30 MIN: CPT | Mod: 25,PBBFAC

## 2023-12-04 PROCEDURE — 73130 X-RAY EXAM OF HAND: CPT | Mod: TC,LT

## 2023-12-04 PROCEDURE — 3078F PR MOST RECENT DIASTOLIC BLOOD PRESSURE < 80 MM HG: ICD-10-PCS | Mod: CPTII,,, | Performed by: STUDENT IN AN ORGANIZED HEALTH CARE EDUCATION/TRAINING PROGRAM

## 2023-12-04 PROCEDURE — 99204 OFFICE O/P NEW MOD 45 MIN: CPT | Mod: 25,S$PBB,, | Performed by: STUDENT IN AN ORGANIZED HEALTH CARE EDUCATION/TRAINING PROGRAM

## 2023-12-04 PROCEDURE — 73130 X-RAY EXAM OF HAND: CPT | Mod: TC,RT

## 2023-12-04 PROCEDURE — 3074F PR MOST RECENT SYSTOLIC BLOOD PRESSURE < 130 MM HG: ICD-10-PCS | Mod: CPTII,,, | Performed by: STUDENT IN AN ORGANIZED HEALTH CARE EDUCATION/TRAINING PROGRAM

## 2023-12-04 PROCEDURE — 3008F PR BODY MASS INDEX (BMI) DOCUMENTED: ICD-10-PCS | Mod: CPTII,,, | Performed by: STUDENT IN AN ORGANIZED HEALTH CARE EDUCATION/TRAINING PROGRAM

## 2023-12-04 PROCEDURE — 1159F PR MEDICATION LIST DOCUMENTED IN MEDICAL RECORD: ICD-10-PCS | Mod: CPTII,,, | Performed by: STUDENT IN AN ORGANIZED HEALTH CARE EDUCATION/TRAINING PROGRAM

## 2023-12-04 PROCEDURE — 3008F BODY MASS INDEX DOCD: CPT | Mod: CPTII,,, | Performed by: STUDENT IN AN ORGANIZED HEALTH CARE EDUCATION/TRAINING PROGRAM

## 2023-12-04 PROCEDURE — 3074F SYST BP LT 130 MM HG: CPT | Mod: CPTII,,, | Performed by: STUDENT IN AN ORGANIZED HEALTH CARE EDUCATION/TRAINING PROGRAM

## 2023-12-04 PROCEDURE — 4010F PR ACE/ARB THEARPY RXD/TAKEN: ICD-10-PCS | Mod: CPTII,,, | Performed by: STUDENT IN AN ORGANIZED HEALTH CARE EDUCATION/TRAINING PROGRAM

## 2023-12-04 PROCEDURE — 4010F ACE/ARB THERAPY RXD/TAKEN: CPT | Mod: CPTII,,, | Performed by: STUDENT IN AN ORGANIZED HEALTH CARE EDUCATION/TRAINING PROGRAM

## 2023-12-04 RX ORDER — LISINOPRIL AND HYDROCHLOROTHIAZIDE 20; 25 MG/1; MG/1
1 TABLET ORAL DAILY
COMMUNITY

## 2023-12-04 RX ORDER — TRIAMCINOLONE ACETONIDE 40 MG/ML
40 INJECTION, SUSPENSION INTRA-ARTICULAR; INTRAMUSCULAR ONCE
Status: COMPLETED | OUTPATIENT
Start: 2023-12-04 | End: 2023-12-04

## 2023-12-04 RX ORDER — CLONIDINE HYDROCHLORIDE 0.1 MG/1
TABLET ORAL
COMMUNITY

## 2023-12-04 RX ORDER — ONDANSETRON 4 MG/1
4 TABLET, FILM COATED ORAL EVERY 8 HOURS PRN
COMMUNITY
Start: 2023-09-29

## 2023-12-04 RX ORDER — AMLODIPINE BESYLATE 10 MG/1
10 TABLET ORAL
COMMUNITY
Start: 2021-11-14

## 2023-12-04 RX ORDER — ALBUTEROL SULFATE 90 UG/1
AEROSOL, METERED RESPIRATORY (INHALATION)
COMMUNITY
Start: 2023-10-26

## 2023-12-04 RX ORDER — CIPROFLOXACIN 500 MG/1
500 TABLET ORAL 2 TIMES DAILY
COMMUNITY
Start: 2023-09-29

## 2023-12-04 RX ORDER — HYDROCODONE BITARTRATE AND ACETAMINOPHEN 7.5; 325 MG/1; MG/1
1 TABLET ORAL 4 TIMES DAILY PRN
COMMUNITY

## 2023-12-04 RX ORDER — LIDOCAINE 50 MG/G
OINTMENT TOPICAL
COMMUNITY
Start: 2022-04-07

## 2023-12-04 RX ORDER — LIDOCAINE HYDROCHLORIDE 10 MG/ML
1 INJECTION, SOLUTION EPIDURAL; INFILTRATION; INTRACAUDAL; PERINEURAL
Status: COMPLETED | OUTPATIENT
Start: 2023-12-04 | End: 2023-12-04

## 2023-12-04 RX ORDER — RANOLAZINE 500 MG/1
500 TABLET, EXTENDED RELEASE ORAL 2 TIMES DAILY
COMMUNITY

## 2023-12-04 RX ORDER — TIZANIDINE 4 MG/1
4 TABLET ORAL 3 TIMES DAILY
COMMUNITY
Start: 2023-11-30

## 2023-12-04 RX ORDER — ASPIRIN 81 MG/1
81 TABLET ORAL
COMMUNITY
Start: 2023-09-29

## 2023-12-04 RX ORDER — SIMVASTATIN 40 MG/1
40 TABLET, FILM COATED ORAL NIGHTLY
COMMUNITY

## 2023-12-04 RX ADMIN — LIDOCAINE HYDROCHLORIDE 10 MG: 10 INJECTION, SOLUTION EPIDURAL; INFILTRATION; INTRACAUDAL; PERINEURAL at 04:12

## 2023-12-04 RX ADMIN — TRIAMCINOLONE ACETONIDE 40 MG: 40 INJECTION, SUSPENSION INTRA-ARTICULAR; INTRAMUSCULAR at 04:12

## 2023-12-04 NOTE — PROGRESS NOTES
"Subjective:    Patient ID: Deja Weir is a left handed 58 y.o. female  who presented to [facility] for new visit.      Chief Complaint: Pain of the Right Hand (Pt here for bilateral hand pain. Pain started in left hand about a year ago, has a plate in from an accident in Nov 2020. Left hand has been hurting for about a year also. Hands get numb randomly.) and Pain of the Left Hand      History of Present Illness:    Deja Weir who has a history of BL CTS R > L  and right radial fracture s/p ORIF in 2000 presented today with Carpal tunnel syndrome involving the bilateral upper extremity for the past 2 years. Pain is located at volar wrist. Quality of pain is described as sharp.  Nonradiating. A/W numbness, weakness and tingling in the thumb, index and long finger. Inciting event: this is a longstanding problem which has been getting worse.  Pain is aggravated by all activiteis.  There is not a history of overuse. Evaluation to date: plain films, PCP, NCS. Treatment to date: none.     Hand Review of Systems:  Swelling?  no  Instability?  no  Clicking?  no  Limited ROM? no  Fever/Chills? no  Subluxation? no  Dislocation? no  Numbness/Tingling? yes  Weakness? yes    Current Choice of Exercise:  none       Objective:      Physical Exam:    /77   Pulse 60   Ht 5' 2" (1.575 m)   Wt 68.5 kg (151 lb)   BMI 27.62 kg/m²     Appearance:  Soft tissue swelling: Left: no Right: no  Effusion: Left:  Negative Right: Negative  Erythema: Left no Right: no  Ecchymosis: Left: no Right: no  Atrophy: Left: no Right: no    Palpation:  Hand/wrist Tenderness: Left: none  Right: dorsal wrist and volar wrist    Range of motion:  Flexion (0-80): Left:  80 Right: 80  Extension (0-70): Left:  70 Right: 70  Ulnar deviation (0-30): 30 Right: 30  Radial deviation (0-20): 20 Right: 20  Supination (0-90): Left: 90 Right: 90  Pronation (0-90): Left: 90 Right: 90  Able to make a power " fist and claw hand: on Both hand(s)  Distal palmar crease-finger tip distance: 0 on Both hand(s)    Strength:  Flexion: Left: 5/5 Pain: no Right: 5/5 Pain: no  Extension: Left: 5/5 Pain: no Right: 5/5 Pain: no  Supination: Left: 5/5 Pain: no Right: 5/5 Pain: no  Pronation: Left: 5/5 Pain: no Right: 5/5 Pain: no  Ulnar deviation: Left: 5/5 Pain: no Right: 5/5 Pain: no  Radial deviation: Left: 5/5 Pain: no Right: 5/5 Pain: no    Special Tests:  Durkans Test (Carpal Compression test): Left: Negative  Right: Negative  Tinels:  Left: Negative  Right: Positive    Phalens: Left: Negative  Right: Positive     FDP test: Left: Negative  Right: Negative  FDS test: Left: Negative  Right: Negative  Reverse Phalens: Left: Not performed Right: Not performed  Finkelstein's Test: Left: Negative Right: Negative      AIN/PIN/Ulnar nerve: Intact and symmetric    General appearance: NAD  Peripheral pulses: normal bilaterally   Reflexes: Left: Not performed Right: Not performed   Sensation: normal    Labs:  Last A1c: The patient doesn't have any registry metric data available     Imaging:   Previous images reviewed.  X-rays ordered and performed today: yes  # of views: 3 Laterality: bilateral  My Interpretation:  Plate and screws from previous right radial ORIF  Distal Radial ulnar joint space is overall Normal on AP views. Scapholunate interval distance is Normal on bilateral AP views. A DISI/VISI is not suggested on bilateral hand series. Negative/positive ulnar variance is not suggested on lbilateral lateral views.  no fracture, dislocation, swelling or degenerative changes noted          Assessment:        Encounter Diagnoses   Code Name Primary?    G56.01 Carpal tunnel syndrome of right wrist Yes    G56.02 Carpal tunnel syndrome of left wrist         Plan:           Orders Placed This Encounter   Procedures    Carpal Tunnel     This order was created via procedure documentation    X-Ray Hand Complete Left     Standing Status:    Future     Number of Occurrences:   1     Standing Expiration Date:   12/4/2024     Order Specific Question:   May the Radiologist modify the order per protocol to meet the clinical needs of the patient?     Answer:   Yes     Order Specific Question:   Release to patient     Answer:   Immediate     Medications Ordered This Encounter   Medications    LIDOcaine (PF) 10 mg/ml (1%) injection 10 mg    triamcinolone acetonide injection 40 mg       MDM: Prior external referring provider notes reviewed. Prior external referring provider studies reviewed.   Dx:  Bilateral carpal tunnel syndrome right greater than left.  New problem in moderate exacerbation.  Treatment Plan: Discussed with patient diagnosis and treatment recommendations.   Discussed operative versus nonoperative management patient consenting to conservative therapy today.  -we will administer right carpal tunnel CSI today.    -recommend wearing night braces for 6 weeks.  -patient to call back p.r.n. if considering CSI of the left wrist.  Imaging: radiological studies ordered and independently reviewed; discussed with patient; pending radiologist interpretation.   Procedure:  See above  Activity: Activity as tolerated  Therapy: No formal therapy  Medication: CONTINUE previously prescribed medication see list . Please see your primary care physician for further refills.  RTC: PRN; call if any issues.         Carpal Tunnel    Date/Time: 12/4/2023 2:00 PM    Performed by: Hipolito Llamas MD  Authorized by: Hipolito Llamas MD    Consent Done?:  Yes (Written)  Indications:  Pain and diagnostic evaluation  Site marked: the procedure site was marked    Timeout: prior to procedure the correct patient, procedure, and site was verified    Local anesthesia used?: Yes    Local anesthetic:  Topical anesthetic  Location:  Wrist  Needle size:  25 G  Approach:  Volar  Patient tolerance:  Patient tolerated the procedure well with no immediate complications     Additional  Comments: Staff: Elias Holt MD    Risks:  Possible complications with the injection include bleeding, infection (.01%), tendon rupture, steroid flare, fat pad or soft tissue atrophy, skin depigmentation, allergic reaction to medications and vasovagal response. (steroid flare treatment is rest, ice, NSAIDs and resolves in 24-36 hours.)    Consent:  No absolute contraindications (cellulitis overlying joint, infection, lack of informed consent, allergy to injection medication, AVN protein or egg allergy for sodium hyaluronate, or history of steroid flare) or relative contraindications (uncontrolled DM2 A1c>10, coagulopathy, INR > 3.5, previous joint replacement or history of AVN).        Description:  The patient was prepped in normal sterile fashion use of chlorhexidine scrub and the appropriate and anatomic landmarks were identified with ultrasound.  Contents of syringe included: 1cc of 1% lidocaine with 40mg of Kenalog     Post Procedure: Patient alert, and moving all extremities. ROM improved, pain decreased.  Good peripheral pulses, no signs of vascular compromise and range of motion intact.  Aftercare instructions were given to patient at time of discharge.  Relative rest for 3 days-avoiding excess activity.  Place ice on the area for 15 minutes every 4-6 hours. Patient may take Tylenol a 1000 mg b.i.d. or ibuprofen 600 mg t.i.d. for the next 3-4 days if not on medication already and safe to take pending co-morbidities.  Protect the area for the next 1-8 hours if anesthetic was used.  Avoid excessive activity for the next 3-4 weeks.  ER precautions given for fever, severe joint pain or allergic reaction or other new symptoms related to the joint injection.        Patient had immediate relief of symptoms after the injection.    Hipolito Llamas MD.  Note dictated using MModal.

## 2023-12-05 NOTE — PROGRESS NOTES
Faculty Attestation: Deja Weir  was seen at [facility] in the Orthopaedic Clinic. Patient seen and evaluated at the time of the visit. History of Present Illness, Physical Exam, and Assessment and Plan reviewed. Treatment plan is reasonable and appropriate. Compliance with treatment recommendations is important. Procedure note reviewed. Present for entire procedure with the resident. Patient tolerated procedure well.      Elias Holt MD  Orthopaedic Surgery